# Patient Record
Sex: FEMALE
[De-identification: names, ages, dates, MRNs, and addresses within clinical notes are randomized per-mention and may not be internally consistent; named-entity substitution may affect disease eponyms.]

---

## 2021-01-08 ENCOUNTER — TRANSCRIPTION ENCOUNTER (OUTPATIENT)
Age: 71
End: 2021-01-08

## 2021-02-05 ENCOUNTER — TRANSCRIPTION ENCOUNTER (OUTPATIENT)
Age: 71
End: 2021-02-05

## 2022-04-22 ENCOUNTER — APPOINTMENT (OUTPATIENT)
Dept: ORTHOPEDIC SURGERY | Facility: CLINIC | Age: 72
End: 2022-04-22
Payer: MEDICARE

## 2022-04-22 DIAGNOSIS — I10 ESSENTIAL (PRIMARY) HYPERTENSION: ICD-10-CM

## 2022-04-22 DIAGNOSIS — E78.00 PURE HYPERCHOLESTEROLEMIA, UNSPECIFIED: ICD-10-CM

## 2022-04-22 DIAGNOSIS — E11.9 TYPE 2 DIABETES MELLITUS W/OUT COMPLICATIONS: ICD-10-CM

## 2022-04-22 DIAGNOSIS — G57.51 TARSAL TUNNEL SYNDROME, RIGHT LOWER LIMB: ICD-10-CM

## 2022-04-22 PROBLEM — Z00.00 ENCOUNTER FOR PREVENTIVE HEALTH EXAMINATION: Status: ACTIVE | Noted: 2022-04-22

## 2022-04-22 PROCEDURE — 99213 OFFICE O/P EST LOW 20 MIN: CPT

## 2022-04-22 NOTE — HISTORY OF PRESENT ILLNESS
[de-identified] : Patient returns s/p RT plantar fascia and tarsal tunnel release 1/18/22. She has been wb in regular shoes.  She has been doing home exercises and was just discharged from PT.  She reports continued improvement, but still has some intermittent pain. [] : Post Surgical Visit: no [FreeTextEntry1] : r foot [de-identified] : 1/18/22 [de-identified] : RT plantar fascia and tarsal tunnel release

## 2022-04-22 NOTE — ASSESSMENT
[FreeTextEntry1] : Patient continues to improve.  She will continue with home exercises.\par Ice/nsaids prn.

## 2022-04-22 NOTE — PHYSICAL EXAM
[Right] : right foot and ankle [NL (40)] : plantar flexion 40 degrees [NL 30)] : inversion 30 degrees [NL (20)] : eversion 20 degrees [5___] : [facility] 5[unfilled]/5 [2+] : posterior tibialis pulse: 2+ [Normal] : saphenous nerve sensation normal [] : non-antalgic [FreeTextEntry3] : Minimal medial hindfoot swelling. [FreeTextEntry8] : Minimal tenderness at plantar fascia insertion.

## 2022-04-22 NOTE — HISTORY OF PRESENT ILLNESS
[de-identified] : Patient returns s/p RT plantar fascia and tarsal tunnel release 1/18/22. She has been wb in regular shoes.  She has been doing home exercises and was just discharged from PT.  She reports continued improvement, but still has some intermittent pain. [] : Post Surgical Visit: no [FreeTextEntry1] : r foot [de-identified] : 1/18/22 [de-identified] : RT plantar fascia and tarsal tunnel release

## 2022-04-22 NOTE — PHYSICAL EXAM
[Right] : right foot and ankle [NL (40)] : plantar flexion 40 degrees [NL 30)] : inversion 30 degrees [NL (20)] : eversion 20 degrees [5___] : [facility] 5[unfilled]/5 [2+] : posterior tibialis pulse: 2+ [Normal] : saphenous nerve sensation normal [] : non-antalgic [FreeTextEntry3] : Minimal medial hindfoot swelling. [FreeTextEntry8] : Minimal tenderness at plantar fascia insertion.

## 2022-09-30 ENCOUNTER — APPOINTMENT (OUTPATIENT)
Dept: ORTHOPEDIC SURGERY | Facility: CLINIC | Age: 72
End: 2022-09-30

## 2022-09-30 PROCEDURE — 99213 OFFICE O/P EST LOW 20 MIN: CPT

## 2022-09-30 NOTE — HISTORY OF PRESENT ILLNESS
[Gradual] : gradual [8] : 8 [5] : 5 [Burning] : burning [Localized] : localized [Sharp] : sharp [Intermittent] : intermittent [Household chores] : household chores [Leisure] : leisure [Social interactions] : social interactions [Rest] : rest [Sitting] : sitting [Standing] : standing [Walking] : walking [Stairs] : stairs [de-identified] : Patient returns s/p RT plantar fascia and tarsal tunnel release 1/18/22. She has been wb in regular shoes.  She has been doing home exercises but recently developed worsening pain in her heel. No new injury or trauma reported. She is interested in PRP as an option.  [] : Post Surgical Visit: no [FreeTextEntry1] : r foot [de-identified] : 1/18/22 [de-identified] : RT plantar fascia and tarsal tunnel release

## 2022-09-30 NOTE — PHYSICAL EXAM
[Right] : right foot and ankle [NL (40)] : plantar flexion 40 degrees [NL 30)] : inversion 30 degrees [NL (20)] : eversion 20 degrees [5___] : [facility] 5[unfilled]/5 [2+] : posterior tibialis pulse: 2+ [Normal] : saphenous nerve sensation normal [] : non-antalgic

## 2022-09-30 NOTE — ASSESSMENT
[FreeTextEntry1] : No tarsal tunnel syndrome. \par Discussed treatment options with patient. \par PRP procedure was discussed and patient opts for this.\par Risks and benefits of PRP discussed with patient. \par Pt. to be scheduled for PRP at her convenience.

## 2022-10-14 ENCOUNTER — APPOINTMENT (OUTPATIENT)
Dept: ORTHOPEDIC SURGERY | Facility: CLINIC | Age: 72
End: 2022-10-14

## 2022-10-14 PROCEDURE — 005KIT: CUSTOM | Mod: 25

## 2022-10-14 PROCEDURE — L4361: CPT | Mod: LT

## 2022-10-14 NOTE — PHYSICAL EXAM
[Right] : right foot and ankle [NL (40)] : plantar flexion 40 degrees [NL 30)] : inversion 30 degrees [NL (20)] : eversion 20 degrees [5___] : [facility] 5[unfilled]/5 [2+] : posterior tibialis pulse: 2+ [Normal] : saphenous nerve sensation normal [] : non-antalgic

## 2022-10-14 NOTE — ASSESSMENT
[FreeTextEntry1] : With patient consent, 50 cc blood drawn from upper extremity using sterile technique. Pt. tolerated this well. 4 cc of PRP obtained and injected into right plantar fascia insertion using ultrasound guidance to localize needle placement. Plantar fascia thickness measures 0.45 cm. Pt. tolerated this well. They are recommended to be weight bearing as tolerated in CAM boot. No ice or NSAIDS permitted at this time. If any issues, patient encouraged to call office.\par \par Patient was instructed that they can not operate an automatic vehicle while wearing a CAM boot or cast on the right lower extremity. If operating a vehicle that requires use of a clutch, patient may not drive while wearing a CAM boot or cast on the left lower extremity.\par \par Progress Note completed by Aaron Antonio PA-C\par * Dr. Treviño - The documentation recorded accurately reflects the physical exam performed, decisions made and plan formulated by me during this visit.\par \par

## 2022-10-14 NOTE — HISTORY OF PRESENT ILLNESS
[Gradual] : gradual [8] : 8 [5] : 5 [Burning] : burning [Localized] : localized [Sharp] : sharp [Intermittent] : intermittent [Household chores] : household chores [Leisure] : leisure [Social interactions] : social interactions [Rest] : rest [Sitting] : sitting [Standing] : standing [Walking] : walking [Stairs] : stairs [de-identified] : Patient returns s/p RT plantar fascia and tarsal tunnel release 1/18/22. She has been wb in regular shoes.  She has been doing home exercises but recently developed worsening pain in her heel. No new injury or trauma reported. Presents today for PRP injection.  [] : Post Surgical Visit: no [FreeTextEntry1] : r foot [de-identified] : 1/18/22 [de-identified] : RT plantar fascia and tarsal tunnel release

## 2022-11-01 ENCOUNTER — OFFICE (OUTPATIENT)
Dept: URBAN - METROPOLITAN AREA CLINIC 86 | Facility: CLINIC | Age: 72
Setting detail: OPHTHALMOLOGY
End: 2022-11-01
Payer: MEDICARE

## 2022-11-01 DIAGNOSIS — H25.11: ICD-10-CM

## 2022-11-01 DIAGNOSIS — H25.13: ICD-10-CM

## 2022-11-01 PROBLEM — H25.12 CATARACT NUCLEAR SCLEROSIS AGE RELATED; RIGHT EYE, LEFT EYE, BOTH EYES: Status: ACTIVE | Noted: 2022-11-01

## 2022-11-01 PROCEDURE — 99213 OFFICE O/P EST LOW 20 MIN: CPT | Performed by: OPHTHALMOLOGY

## 2022-11-01 PROCEDURE — 92136 OPHTHALMIC BIOMETRY: CPT | Performed by: OPHTHALMOLOGY

## 2022-11-01 ASSESSMENT — REFRACTION_CURRENTRX
OD_OVR_VA: 20/
OS_AXIS: 060
OD_CYLINDER: +1.00
OS_CYLINDER: +0.50
OD_AXIS: 150
OS_OVR_VA: 20/
OS_SPHERE: -2.25
OD_SPHERE: -3.75

## 2022-11-01 ASSESSMENT — TONOMETRY
OS_IOP_MMHG: 12
OD_IOP_MMHG: 12

## 2022-11-01 ASSESSMENT — PACHYMETRY
OD_CT_CORRECTION: 2
OS_CT_CORRECTION: 2
OS_CT_UM: 515
OD_CT_UM: 515

## 2022-11-01 ASSESSMENT — CONFRONTATIONAL VISUAL FIELD TEST (CVF)
OS_FINDINGS: FULL
OD_FINDINGS: FULL

## 2022-11-01 ASSESSMENT — VISUAL ACUITY
OS_BCVA: 20/25-2
OD_BCVA: 20/30+2

## 2022-11-01 ASSESSMENT — TEAR BREAK UP TIME (TBUT)
OS_TBUT: 1+
OD_TBUT: 1+

## 2022-11-04 ENCOUNTER — APPOINTMENT (OUTPATIENT)
Dept: ORTHOPEDIC SURGERY | Facility: CLINIC | Age: 72
End: 2022-11-04

## 2022-11-11 ENCOUNTER — APPOINTMENT (OUTPATIENT)
Dept: ORTHOPEDIC SURGERY | Facility: CLINIC | Age: 72
End: 2022-11-11

## 2022-11-11 DIAGNOSIS — M72.2 PLANTAR FASCIAL FIBROMATOSIS: ICD-10-CM

## 2022-11-11 PROCEDURE — 99213 OFFICE O/P EST LOW 20 MIN: CPT

## 2022-11-11 NOTE — ASSESSMENT
[FreeTextEntry1] : Patient is doing much better.  She will continue with home stretching, supportive sneakers and her custom orthotics.\par \par

## 2022-11-11 NOTE — PHYSICAL EXAM
[Right] : right foot and ankle [NL (40)] : plantar flexion 40 degrees [NL 30)] : inversion 30 degrees [NL (20)] : eversion 20 degrees [5___] : [facility] 5[unfilled]/5 [2+] : posterior tibialis pulse: 2+ [Normal] : saphenous nerve sensation normal [] : non-antalgic [FreeTextEntry8] : Minimal inconsistent tenderness at plantar fascia insertion; much less than last visit.

## 2022-11-11 NOTE — HISTORY OF PRESENT ILLNESS
[Gradual] : gradual [8] : 8 [5] : 5 [Burning] : burning [Localized] : localized [Sharp] : sharp [Intermittent] : intermittent [Household chores] : household chores [Leisure] : leisure [Social interactions] : social interactions [Rest] : rest [Sitting] : sitting [Standing] : standing [Walking] : walking [Stairs] : stairs [de-identified] : Patient returns s/p RT plantar fascia and tarsal tunnel release 1/18/22.  PRP injection on 10/14/22.  Since last visit she has obtained her custom orthotics.  Overall she reports improvement, but still has some pain. [] : Post Surgical Visit: no [FreeTextEntry1] : r foot [de-identified] : 1/18/22 [de-identified] : RT plantar fascia and tarsal tunnel release

## 2023-01-06 ENCOUNTER — APPOINTMENT (OUTPATIENT)
Dept: ORTHOPEDIC SURGERY | Facility: CLINIC | Age: 73
End: 2023-01-06

## 2023-01-19 ENCOUNTER — AMBULATORY SURGERY CENTER (OUTPATIENT)
Dept: URBAN - METROPOLITAN AREA SURGERY 17 | Facility: SURGERY | Age: 73
Setting detail: OPHTHALMOLOGY
End: 2023-01-19
Payer: MEDICARE

## 2023-01-19 DIAGNOSIS — H52.211: ICD-10-CM

## 2023-01-19 DIAGNOSIS — H25.11: ICD-10-CM

## 2023-01-19 PROCEDURE — 66984 XCAPSL CTRC RMVL W/O ECP: CPT | Performed by: OPHTHALMOLOGY

## 2023-01-19 PROCEDURE — A9270 NON-COVERED ITEM OR SERVICE: HCPCS | Performed by: OPHTHALMOLOGY

## 2023-01-20 ENCOUNTER — RX ONLY (RX ONLY)
Age: 73
End: 2023-01-20

## 2023-01-20 ENCOUNTER — OFFICE (OUTPATIENT)
Dept: URBAN - METROPOLITAN AREA CLINIC 86 | Facility: CLINIC | Age: 73
Setting detail: OPHTHALMOLOGY
End: 2023-01-20
Payer: MEDICARE

## 2023-01-20 DIAGNOSIS — H25.12: ICD-10-CM

## 2023-01-20 DIAGNOSIS — Z96.1: ICD-10-CM

## 2023-01-20 DIAGNOSIS — H40.003: ICD-10-CM

## 2023-01-20 PROCEDURE — 99024 POSTOP FOLLOW-UP VISIT: CPT | Performed by: OPHTHALMOLOGY

## 2023-01-20 ASSESSMENT — PACHYMETRY
OD_CT_UM: 515
OS_CT_UM: 515
OS_CT_CORRECTION: 2
OD_CT_CORRECTION: 2

## 2023-01-20 ASSESSMENT — TEAR BREAK UP TIME (TBUT)
OD_TBUT: 1+
OS_TBUT: 1+

## 2023-01-20 ASSESSMENT — REFRACTION_CURRENTRX
OS_SPHERE: -2.25
OS_CYLINDER: +0.50
OS_AXIS: 060
OS_OVR_VA: 20/
OD_OVR_VA: 20/
OD_SPHERE: -3.75
OD_AXIS: 150
OD_CYLINDER: +1.00

## 2023-01-20 ASSESSMENT — CORNEAL EDEMA - FOLDS/STRIAE: OD_FOLDSSTRIAE: 1+

## 2023-01-20 ASSESSMENT — TONOMETRY: OD_IOP_MMHG: 13

## 2023-01-20 ASSESSMENT — VISUAL ACUITY
OD_BCVA: 20/30+2
OS_BCVA: 20/40-2

## 2023-01-20 ASSESSMENT — CONFRONTATIONAL VISUAL FIELD TEST (CVF)
OD_FINDINGS: FULL
OS_FINDINGS: FULL

## 2023-01-25 ENCOUNTER — OFFICE (OUTPATIENT)
Dept: URBAN - METROPOLITAN AREA CLINIC 86 | Facility: CLINIC | Age: 73
Setting detail: OPHTHALMOLOGY
End: 2023-01-25
Payer: MEDICARE

## 2023-01-25 DIAGNOSIS — H25.12: ICD-10-CM

## 2023-01-25 PROCEDURE — 92136 OPHTHALMIC BIOMETRY: CPT | Performed by: OPHTHALMOLOGY

## 2023-01-25 ASSESSMENT — VISUAL ACUITY
OD_BCVA: 20/30+2
OS_BCVA: 20/30-2

## 2023-01-25 ASSESSMENT — REFRACTION_AUTOREFRACTION
OD_CYLINDER: +0.75
OD_AXIS: 120
OD_SPHERE: -1.50

## 2023-01-25 ASSESSMENT — TEAR BREAK UP TIME (TBUT)
OS_TBUT: 1+
OD_TBUT: 1+

## 2023-01-25 ASSESSMENT — REFRACTION_CURRENTRX
OD_OVR_VA: 20/
OS_CYLINDER: +0.50
OS_SPHERE: -2.25
OD_CYLINDER: +1.00
OD_SPHERE: -3.75
OD_AXIS: 150
OS_AXIS: 060
OS_OVR_VA: 20/

## 2023-01-25 ASSESSMENT — SPHEQUIV_DERIVED
OD_SPHEQUIV: -0.25
OD_SPHEQUIV: -1.125

## 2023-01-25 ASSESSMENT — REFRACTION_MANIFEST
OD_AXIS: 120
OD_VA1: 20/20-2
OS_SPHERE: -2.00
OD_SPHERE: -0.50
OS_VA1: 20/40
OD_CYLINDER: +0.50

## 2023-01-25 ASSESSMENT — CONFRONTATIONAL VISUAL FIELD TEST (CVF)
OD_FINDINGS: FULL
OS_FINDINGS: FULL

## 2023-01-25 ASSESSMENT — CORNEAL EDEMA - FOLDS/STRIAE: OD_FOLDSSTRIAE: 1+

## 2023-02-02 ENCOUNTER — AMBULATORY SURGERY CENTER (OUTPATIENT)
Dept: URBAN - METROPOLITAN AREA SURGERY 17 | Facility: SURGERY | Age: 73
Setting detail: OPHTHALMOLOGY
End: 2023-02-02
Payer: MEDICARE

## 2023-02-02 DIAGNOSIS — H52.212: ICD-10-CM

## 2023-02-02 DIAGNOSIS — H25.12: ICD-10-CM

## 2023-02-02 PROCEDURE — 66984 XCAPSL CTRC RMVL W/O ECP: CPT | Performed by: OPHTHALMOLOGY

## 2023-02-03 ENCOUNTER — OFFICE (OUTPATIENT)
Dept: URBAN - METROPOLITAN AREA CLINIC 86 | Facility: CLINIC | Age: 73
Setting detail: OPHTHALMOLOGY
End: 2023-02-03
Payer: MEDICARE

## 2023-02-03 DIAGNOSIS — Z96.1: ICD-10-CM

## 2023-02-03 DIAGNOSIS — H40.003: ICD-10-CM

## 2023-02-03 PROCEDURE — 99024 POSTOP FOLLOW-UP VISIT: CPT | Performed by: OPHTHALMOLOGY

## 2023-02-03 ASSESSMENT — CONFRONTATIONAL VISUAL FIELD TEST (CVF)
OS_FINDINGS: FULL
OD_FINDINGS: FULL

## 2023-02-03 ASSESSMENT — REFRACTION_CURRENTRX
OS_CYLINDER: +0.50
OD_SPHERE: -3.75
OS_OVR_VA: 20/
OD_AXIS: 150
OS_SPHERE: -2.25
OS_AXIS: 060
OD_OVR_VA: 20/
OD_CYLINDER: +1.00

## 2023-02-03 ASSESSMENT — REFRACTION_AUTOREFRACTION
OD_CYLINDER: +0.75
OD_SPHERE: -1.50
OD_AXIS: 120

## 2023-02-03 ASSESSMENT — PACHYMETRY
OS_CT_UM: 515
OD_CT_UM: 515
OS_CT_CORRECTION: 2
OD_CT_CORRECTION: 2

## 2023-02-03 ASSESSMENT — SPHEQUIV_DERIVED
OD_SPHEQUIV: -1.125
OD_SPHEQUIV: -0.25

## 2023-02-03 ASSESSMENT — VISUAL ACUITY
OD_BCVA: 20/20
OS_BCVA: 20/30-2

## 2023-02-03 ASSESSMENT — REFRACTION_MANIFEST
OD_SPHERE: -0.50
OD_CYLINDER: +0.50
OS_VA1: 20/40
OS_SPHERE: -2.00
OD_VA1: 20/20-2
OD_AXIS: 120

## 2023-02-03 ASSESSMENT — TONOMETRY: OS_IOP_MMHG: 14

## 2023-02-10 ENCOUNTER — OFFICE (OUTPATIENT)
Dept: URBAN - METROPOLITAN AREA CLINIC 86 | Facility: CLINIC | Age: 73
Setting detail: OPHTHALMOLOGY
End: 2023-02-10
Payer: MEDICARE

## 2023-02-10 DIAGNOSIS — H40.003: ICD-10-CM

## 2023-02-10 DIAGNOSIS — Z96.1: ICD-10-CM

## 2023-02-10 PROCEDURE — 99024 POSTOP FOLLOW-UP VISIT: CPT | Performed by: OPHTHALMOLOGY

## 2023-02-10 ASSESSMENT — TONOMETRY
OS_IOP_MMHG: 14
OD_IOP_MMHG: 15

## 2023-02-10 ASSESSMENT — REFRACTION_CURRENTRX
OS_SPHERE: -2.25
OD_CYLINDER: +1.00
OD_SPHERE: -3.75
OD_AXIS: 150
OD_OVR_VA: 20/
OS_OVR_VA: 20/
OS_AXIS: 060
OS_CYLINDER: +0.50

## 2023-02-10 ASSESSMENT — PACHYMETRY
OS_CT_CORRECTION: 2
OS_CT_UM: 515
OD_CT_UM: 515
OD_CT_CORRECTION: 2

## 2023-02-10 ASSESSMENT — REFRACTION_MANIFEST
OD_VA1: 20/20-2
OS_VA1: 20/40
OD_CYLINDER: +0.50
OD_SPHERE: -0.50
OS_SPHERE: -2.00
OD_AXIS: 120

## 2023-02-10 ASSESSMENT — VISUAL ACUITY
OS_BCVA: 20/20-2
OD_BCVA: 20/20-2

## 2023-02-10 ASSESSMENT — SPHEQUIV_DERIVED
OD_SPHEQUIV: -1.125
OD_SPHEQUIV: -0.25

## 2023-02-10 ASSESSMENT — REFRACTION_AUTOREFRACTION
OD_SPHERE: -1.50
OD_AXIS: 120
OD_CYLINDER: +0.75

## 2023-03-15 ENCOUNTER — OFFICE (OUTPATIENT)
Dept: URBAN - METROPOLITAN AREA CLINIC 86 | Facility: CLINIC | Age: 73
Setting detail: OPHTHALMOLOGY
End: 2023-03-15
Payer: MEDICARE

## 2023-03-15 DIAGNOSIS — Z96.1: ICD-10-CM

## 2023-03-15 DIAGNOSIS — H40.003: ICD-10-CM

## 2023-03-15 PROCEDURE — 99024 POSTOP FOLLOW-UP VISIT: CPT | Performed by: OPHTHALMOLOGY

## 2023-03-15 ASSESSMENT — REFRACTION_CURRENTRX
OS_OVR_VA: 20/
OS_CYLINDER: +0.50
OD_SPHERE: -3.75
OD_CYLINDER: +1.00
OD_OVR_VA: 20/
OS_AXIS: 060
OD_AXIS: 150
OS_SPHERE: -2.25

## 2023-03-15 ASSESSMENT — TONOMETRY
OS_IOP_MMHG: 13
OD_IOP_MMHG: 13

## 2023-03-15 ASSESSMENT — PACHYMETRY
OS_CT_UM: 515
OD_CT_CORRECTION: 2
OS_CT_CORRECTION: 2
OD_CT_UM: 515

## 2023-03-15 ASSESSMENT — REFRACTION_MANIFEST
OS_ADD: +2.00
OD_SPHERE: PLANO
OD_VA1: 20/20-
OS_VA1: 20/20-
OS_SPHERE: PLANO
OD_ADD: +2.00

## 2023-03-15 ASSESSMENT — VISUAL ACUITY
OS_BCVA: 20/25+
OD_BCVA: 20/25+

## 2023-03-15 ASSESSMENT — CONFRONTATIONAL VISUAL FIELD TEST (CVF)
OS_FINDINGS: FULL
OD_FINDINGS: FULL

## 2023-03-15 ASSESSMENT — REFRACTION_AUTOREFRACTION
OD_CYLINDER: +0.75
OD_AXIS: 120
OD_SPHERE: -1.50

## 2023-03-15 ASSESSMENT — SPHEQUIV_DERIVED: OD_SPHEQUIV: -1.125

## 2025-04-17 ENCOUNTER — OFFICE (OUTPATIENT)
Dept: URBAN - METROPOLITAN AREA CLINIC 86 | Facility: CLINIC | Age: 75
Setting detail: OPHTHALMOLOGY
End: 2025-04-17
Payer: MEDICARE

## 2025-04-17 DIAGNOSIS — H40.003: ICD-10-CM

## 2025-04-17 PROBLEM — H16.223 DRY EYE SYNDROME K SICCA; BOTH EYES: Status: ACTIVE | Noted: 2025-04-17

## 2025-04-17 PROCEDURE — 92250 FUNDUS PHOTOGRAPHY W/I&R: CPT | Performed by: OPHTHALMOLOGY

## 2025-04-17 PROCEDURE — 92014 COMPRE OPH EXAM EST PT 1/>: CPT | Performed by: OPHTHALMOLOGY

## 2025-04-17 PROCEDURE — 92020 GONIOSCOPY: CPT | Performed by: OPHTHALMOLOGY

## 2025-04-17 ASSESSMENT — REFRACTION_MANIFEST
OD_ADD: +2.00
OS_SPHERE: PLANO
OD_SPHERE: PLANO
OS_VA1: 20/20-
OD_VA1: 20/20-
OS_ADD: +2.00

## 2025-04-17 ASSESSMENT — TEAR BREAK UP TIME (TBUT)
OD_TBUT: 1+
OS_TBUT: 1+

## 2025-04-17 ASSESSMENT — PACHYMETRY
OS_CT_UM: 515
OD_CT_UM: 515
OS_CT_CORRECTION: 2
OD_CT_CORRECTION: 2

## 2025-04-17 ASSESSMENT — CONFRONTATIONAL VISUAL FIELD TEST (CVF)
OD_FINDINGS: FULL
OS_FINDINGS: FULL

## 2025-04-17 ASSESSMENT — REFRACTION_AUTOREFRACTION
OD_AXIS: 120
OD_SPHERE: -1.50
OD_CYLINDER: +0.75

## 2025-04-17 ASSESSMENT — TONOMETRY
OD_IOP_MMHG: 12
OS_IOP_MMHG: 12

## 2025-04-17 ASSESSMENT — VISUAL ACUITY
OS_BCVA: 20/25+
OD_BCVA: 20/25+

## 2025-04-17 ASSESSMENT — REFRACTION_CURRENTRX
OS_OVR_VA: 20/
OD_OVR_VA: 20/

## 2025-06-24 ENCOUNTER — OFFICE (OUTPATIENT)
Dept: URBAN - METROPOLITAN AREA CLINIC 29 | Facility: CLINIC | Age: 75
Setting detail: OPHTHALMOLOGY
End: 2025-06-24
Payer: MEDICARE

## 2025-06-24 DIAGNOSIS — H40.003: ICD-10-CM

## 2025-06-24 DIAGNOSIS — H16.223: ICD-10-CM

## 2025-06-24 DIAGNOSIS — H02.403: ICD-10-CM

## 2025-06-24 DIAGNOSIS — H02.135: ICD-10-CM

## 2025-06-24 DIAGNOSIS — H11.153: ICD-10-CM

## 2025-06-24 DIAGNOSIS — Z96.1: ICD-10-CM

## 2025-06-24 DIAGNOSIS — H02.132: ICD-10-CM

## 2025-06-24 PROCEDURE — 92133 CPTRZD OPH DX IMG PST SGM ON: CPT | Performed by: OPHTHALMOLOGY

## 2025-06-24 PROCEDURE — 92083 EXTENDED VISUAL FIELD XM: CPT | Performed by: OPHTHALMOLOGY

## 2025-06-24 PROCEDURE — 99213 OFFICE O/P EST LOW 20 MIN: CPT | Performed by: OPHTHALMOLOGY

## 2025-06-24 ASSESSMENT — PACHYMETRY
OS_CT_CORRECTION: 2
OD_CT_UM: 515
OD_CT_CORRECTION: 2
OS_CT_UM: 515

## 2025-06-24 ASSESSMENT — REFRACTION_MANIFEST
OD_VA1: 20/20-
OS_SPHERE: PLANO
OS_VA1: 20/20-
OD_SPHERE: PLANO
OD_ADD: +2.00
OS_ADD: +2.00

## 2025-06-24 ASSESSMENT — KERATOMETRY
OD_K1POWER_DIOPTERS: 42.75
OS_AXISANGLE_DEGREES: 073
OS_K2POWER_DIOPTERS: 44.25
OS_K1POWER_DIOPTERS: 42.75
OD_AXISANGLE_DEGREES: 117
OD_K2POWER_DIOPTERS: 44.25

## 2025-06-24 ASSESSMENT — LID POSITION - PTOSIS
OD_PTOSIS: RUL 2+
OS_PTOSIS: LUL 1+ 2+

## 2025-06-24 ASSESSMENT — CONFRONTATIONAL VISUAL FIELD TEST (CVF)
OS_FINDINGS: FULL
OD_FINDINGS: FULL

## 2025-06-24 ASSESSMENT — REFRACTION_AUTOREFRACTION
OD_CYLINDER: +1.00
OD_AXIS: 092
OD_SPHERE: -1.75
OS_SPHERE: -2.00
OS_AXIS: 086
OS_CYLINDER: +1.75

## 2025-06-24 ASSESSMENT — VISUAL ACUITY
OD_BCVA: 20/30+1
OS_BCVA: 20/25-1

## 2025-06-24 ASSESSMENT — TONOMETRY
OD_IOP_MMHG: 13
OS_IOP_MMHG: 13

## 2025-06-24 ASSESSMENT — LID POSITION - ECTROPION
OD_ECTROPION: RLL 1+
OS_ECTROPION: LLL 1+

## 2025-06-24 ASSESSMENT — TEAR BREAK UP TIME (TBUT)
OS_TBUT: 1+
OD_TBUT: 1+

## 2025-07-08 ENCOUNTER — OFFICE (OUTPATIENT)
Dept: URBAN - METROPOLITAN AREA CLINIC 29 | Facility: CLINIC | Age: 75
Setting detail: OPHTHALMOLOGY
End: 2025-07-08
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
## 2025-07-08 PROCEDURE — NO SHOW FE NO SHOW FEE: Performed by: OPHTHALMOLOGY

## 2025-08-05 ENCOUNTER — OFFICE (OUTPATIENT)
Dept: URBAN - METROPOLITAN AREA CLINIC 29 | Facility: CLINIC | Age: 75
Setting detail: OPHTHALMOLOGY
End: 2025-08-05
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
## 2025-08-05 PROCEDURE — NO SHOW FE NO SHOW FEE: Performed by: OPHTHALMOLOGY
